# Patient Record
Sex: MALE | Race: WHITE | HISPANIC OR LATINO | URBAN - METROPOLITAN AREA
[De-identification: names, ages, dates, MRNs, and addresses within clinical notes are randomized per-mention and may not be internally consistent; named-entity substitution may affect disease eponyms.]

---
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
## 2025-04-12 ENCOUNTER — OFFICE VISIT (OUTPATIENT)
Dept: URGENT CARE | Facility: PHYSICIAN GROUP | Age: 20
End: 2025-04-12
Payer: COMMERCIAL

## 2025-04-12 VITALS
BODY MASS INDEX: 20.43 KG/M2 | RESPIRATION RATE: 12 BRPM | WEIGHT: 119 LBS | DIASTOLIC BLOOD PRESSURE: 62 MMHG | HEART RATE: 65 BPM | TEMPERATURE: 97.7 F | OXYGEN SATURATION: 97 % | SYSTOLIC BLOOD PRESSURE: 98 MMHG

## 2025-04-12 DIAGNOSIS — H00.021 HORDEOLUM INTERNUM OF RIGHT UPPER EYELID: ICD-10-CM

## 2025-04-12 DIAGNOSIS — D22.9 SKIN MOLE: ICD-10-CM

## 2025-04-12 PROCEDURE — 3078F DIAST BP <80 MM HG: CPT

## 2025-04-12 PROCEDURE — 3074F SYST BP LT 130 MM HG: CPT

## 2025-04-12 PROCEDURE — 99212 OFFICE O/P EST SF 10 MIN: CPT

## 2025-04-12 ASSESSMENT — ENCOUNTER SYMPTOMS
SINUS PAIN: 0
FOREIGN BODY SENSATION: 0
EYE REDNESS: 0
CARDIOVASCULAR NEGATIVE: 1
EYE DISCHARGE: 0
EYE PAIN: 1
BLURRED VISION: 0
PHOTOPHOBIA: 0
DOUBLE VISION: 0
RESPIRATORY NEGATIVE: 1

## 2025-04-12 NOTE — PROGRESS NOTES
Subjective:   Chief Complaint  Vernon Zuleta is a 20 y.o. male who presents for Eye Problem (X2weeks, Right eye problem, swollen/redness/irritation, sty, worsening )      History of Present Illness  Eye Problem   The right eye is affected. This is a new problem. The current episode started 1 to 4 weeks ago. The problem occurs constantly. The problem has been gradually worsening. There was no injury mechanism. The pain is at a severity of 3/10. The patient is experiencing no pain. There is No known exposure to pink eye. He Does not wear contacts. Pertinent negatives include no blurred vision, eye discharge, double vision, eye redness, foreign body sensation or photophobia. He has tried eye drops for the symptoms. The treatment provided mild relief.       Review of Systems  Review of Systems   HENT:  Negative for congestion and sinus pain.    Eyes:  Positive for pain. Negative for blurred vision, double vision, photophobia, discharge and redness.   Respiratory: Negative.     Cardiovascular: Negative.        Past Medical History  Past Medical History:   Diagnosis Date    Short stature for age 10/22/2018       Family History  Family History   Problem Relation Age of Onset    Diabetes Maternal Grandmother     Diabetes Maternal Grandfather     Diabetes Paternal Grandmother     Cancer Paternal Grandfather        Social History  Social History     Socioeconomic History    Marital status: Single   Tobacco Use    Smoking status: Never    Smokeless tobacco: Never   Vaping Use    Vaping status: Never Used   Substance and Sexual Activity    Alcohol use: No    Drug use: No    Sexual activity: Never   Other Topics Concern    Behavioral problems No    Interpersonal relationships No    Sad or not enjoying activities No    Suicidal thoughts No    Poor school performance No    Reading difficulties No    Speech difficulties No    Writing difficulties No    Inadequate sleep No    Excessive TV viewing No    Excessive video game use Yes     Inadequate exercise No    Sports related No    Poor diet No    Second-hand smoke exposure No    Family concerns for drug/alcohol abuse No    Violence concerns No    Poor oral hygiene No    Bike safety No    Family concerns vehicle safety No       Surgical History  No past surgical history on file.    Current Medications  Home Medications       Reviewed by Phil Espinoza (Medical Assistant) on 04/12/25 at 1233  Med List Status: <None>     Medication Last Dose Status        Patient Irvin Taking any Medications                           Allergies  No Known Allergies       Objective:     BP 98/62 (BP Location: Right arm, Patient Position: Sitting, BP Cuff Size: Small adult)   Pulse 65   Temp 36.5 °C (97.7 °F) (Temporal)   Resp 12   Wt 54 kg (119 lb)   SpO2 97%     Physical Exam  Constitutional:       Appearance: Normal appearance.   HENT:      Head: Normocephalic and atraumatic.      Right Ear: Tympanic membrane, ear canal and external ear normal.      Left Ear: Tympanic membrane, ear canal and external ear normal.      Nose: Nose normal.      Mouth/Throat:      Mouth: Mucous membranes are moist.      Pharynx: Oropharynx is clear.   Eyes:      General:         Right eye: Hordeolum present.      Conjunctiva/sclera: Conjunctivae normal.      Pupils: Pupils are equal, round, and reactive to light.     Cardiovascular:      Rate and Rhythm: Normal rate and regular rhythm.      Pulses: Normal pulses.      Heart sounds: Normal heart sounds.   Pulmonary:      Effort: Pulmonary effort is normal.      Breath sounds: Normal breath sounds.   Abdominal:      General: Bowel sounds are normal.   Skin:     General: Skin is warm and dry.      Capillary Refill: Capillary refill takes less than 2 seconds.   Neurological:      General: No focal deficit present.      Mental Status: He is alert and oriented to person, place, and time.   Psychiatric:         Mood and Affect: Mood normal.         Behavior: Behavior  normal.         Assessment/Plan:     Diagnosis  1. Hordeolum internum of right upper eyelid    2. Skin mole  - Referral to Dermatology        MDM/Plan/Discussion  Use a hot washcloth or eye mask, and hold it on your closed eyes for 5 minutes, applying intermittent light pressure. Do this at least 2 times per day, 3-4 times if possible.  If the symptoms worsen or are not resolved in 10-14 days please return for further evaluation.       Shared decision-making was utilized with patient for treatment plan. Medication discussed included indication for use and the potential benefits and side effects. Education was provided regarding the aforementioned assessments.  Differential Diagnosis, natural history, and supportive care discussed. All of the patient's questions were answered to their satisfaction at the time of discharge. Patient was encouraged to monitor symptoms closely. Those signs and symptoms which would warrant concern and mandate seeking a higher level of service through the emergency department discussed at length.  Patient stated agreement and understanding of this plan of care.    Advised the patient to follow-up with the primary care physician for recheck, reevaluation, and consideration of further management.    Please note that this dictation was created using voice recognition software. I have made a reasonable attempt to correct obvious errors, but I expect that there are errors of grammar and possibly content that I did not discover before finalizing the note.    This note was electronically signed by KENYATTA Haq